# Patient Record
Sex: MALE | Race: WHITE | NOT HISPANIC OR LATINO | URBAN - METROPOLITAN AREA
[De-identification: names, ages, dates, MRNs, and addresses within clinical notes are randomized per-mention and may not be internally consistent; named-entity substitution may affect disease eponyms.]

---

## 2017-05-03 PROBLEM — 40739000 DYSPHAGIA: Status: ACTIVE | Noted: 2017-05-03

## 2017-05-03 PROBLEM — 428283002 HISTORY OF POLYP OF COLON (SITUATION): Status: ACTIVE | Noted: 2017-05-03

## 2019-04-25 PROBLEM — 414916001 OBESITY: Status: ACTIVE | Noted: 2019-04-25

## 2022-02-17 ENCOUNTER — OFFICE VISIT (OUTPATIENT)
Dept: URBAN - METROPOLITAN AREA CLINIC 27 | Facility: CLINIC | Age: 62
End: 2022-02-17

## 2022-04-04 ENCOUNTER — OFFICE VISIT (OUTPATIENT)
Dept: URBAN - METROPOLITAN AREA SURGERY CENTER 7 | Facility: SURGERY CENTER | Age: 62
End: 2022-04-04

## 2022-04-30 ENCOUNTER — TELEPHONE ENCOUNTER (OUTPATIENT)
Dept: URBAN - METROPOLITAN AREA CLINIC 121 | Facility: CLINIC | Age: 62
End: 2022-04-30

## 2022-04-30 RX ORDER — LISINOPRIL AND HYDROCHLOROTHIAZIDE TABLETS 20; 25 MG/1; MG/1
TABLET ORAL
OUTPATIENT
Start: 2010-06-01

## 2022-04-30 RX ORDER — ASPIRIN 81 MG/1
TABLET, COATED ORAL
OUTPATIENT
Start: 2010-06-01

## 2022-04-30 RX ORDER — ATENOLOL 50 MG/1
TABLET ORAL
OUTPATIENT
Start: 2010-06-01

## 2022-04-30 RX ORDER — SERTRALINE 50 MG/1
TABLET, FILM COATED ORAL
OUTPATIENT
Start: 2010-06-01 | End: 2011-06-08

## 2022-04-30 RX ORDER — ALPRAZOLAM 0.5 MG
TABLET ORAL
OUTPATIENT
Start: 2017-05-03

## 2022-04-30 RX ORDER — SERTRALINE 50 MG/1
TABLET, FILM COATED ORAL
OUTPATIENT
Start: 2010-06-01

## 2022-04-30 RX ORDER — ALPRAZOLAM 0.5 MG
TABLET ORAL
OUTPATIENT
Start: 2017-05-03 | End: 2019-04-25

## 2022-04-30 RX ORDER — SIMVASTATIN 40 MG/1
TABLET, FILM COATED ORAL
OUTPATIENT
Start: 2010-06-01

## 2022-05-01 ENCOUNTER — TELEPHONE ENCOUNTER (OUTPATIENT)
Dept: URBAN - METROPOLITAN AREA CLINIC 121 | Facility: CLINIC | Age: 62
End: 2022-05-01

## 2022-05-01 RX ORDER — PHENAZOPYRIDINE HYDROCHLORIDE 100 MG/1
QD TABLET, COATED ORAL
Status: ACTIVE | COMMUNITY
Start: 2014-08-12

## 2022-05-01 RX ORDER — SITAGLIPTIN AND METFORMIN HYDROCHLORIDE 1000; 50 MG/1; MG/1
QD TABLET, FILM COATED, EXTENDED RELEASE ORAL
Status: ACTIVE | COMMUNITY
Start: 2014-08-12

## 2022-05-01 RX ORDER — ASPIRIN 81 MG/1
QD TABLET, COATED ORAL
Status: ACTIVE | COMMUNITY
Start: 2014-08-12

## 2022-05-01 RX ORDER — ACETAMINOPHEN 500 MG
TABLET ORAL
Status: ACTIVE | COMMUNITY
Start: 2017-05-03

## 2022-05-01 RX ORDER — ATENOLOL 50 MG/1
QD TABLET ORAL
Status: ACTIVE | COMMUNITY
Start: 2014-08-12

## 2022-05-01 RX ORDER — SIMVASTATIN 40 MG/1
QD TABLET, FILM COATED ORAL
Status: ACTIVE | COMMUNITY
Start: 2014-08-12

## 2022-05-01 RX ORDER — GLUCOSAMINE/MSM/CHONDROIT SULF 500-166.6
TABLET ORAL
Status: ACTIVE | COMMUNITY
Start: 2010-06-01

## 2022-05-01 RX ORDER — LISINOPRIL AND HYDROCHLOROTHIAZIDE TABLETS 20; 25 MG/1; MG/1
QD TABLET ORAL
Status: ACTIVE | COMMUNITY
Start: 2014-08-12

## 2022-05-01 RX ORDER — DULOXETINE HCL 30 MG
CAPSULE,DELAYED RELEASE (ENTERIC COATED) ORAL
Status: ACTIVE | COMMUNITY

## 2024-06-19 ENCOUNTER — OFFICE VISIT (OUTPATIENT)
Dept: URBAN - METROPOLITAN AREA CLINIC 27 | Facility: CLINIC | Age: 64
End: 2024-06-19
Payer: COMMERCIAL

## 2024-06-19 ENCOUNTER — DASHBOARD ENCOUNTERS (OUTPATIENT)
Age: 64
End: 2024-06-19

## 2024-06-19 VITALS
HEART RATE: 59 BPM | SYSTOLIC BLOOD PRESSURE: 130 MMHG | DIASTOLIC BLOOD PRESSURE: 73 MMHG | BODY MASS INDEX: 35.36 KG/M2 | WEIGHT: 247 LBS | HEIGHT: 70 IN

## 2024-06-19 DIAGNOSIS — Z86.010 PERSONAL HISTORY OF COLONIC POLYPS: ICD-10-CM

## 2024-06-19 DIAGNOSIS — I10 PRIMARY HYPERTENSION: ICD-10-CM

## 2024-06-19 DIAGNOSIS — E11.9 TYPE 2 DIABETES MELLITUS WITHOUT COMPLICATION, UNSPECIFIED WHETHER LONG TERM INSULIN USE: ICD-10-CM

## 2024-06-19 DIAGNOSIS — K57.50 DIVERTICUL DISEASE SMALL AND LARGE INTESTINE, NO PERFORATI OR ABSCESS: ICD-10-CM

## 2024-06-19 PROBLEM — 397881000: Status: ACTIVE | Noted: 2024-06-19

## 2024-06-19 PROCEDURE — 99203 OFFICE O/P NEW LOW 30 MIN: CPT | Performed by: INTERNAL MEDICINE

## 2024-06-19 PROCEDURE — 99243 OFF/OP CNSLTJ NEW/EST LOW 30: CPT | Performed by: INTERNAL MEDICINE

## 2024-06-19 RX ORDER — LISINOPRIL AND HYDROCHLOROTHIAZIDE TABLETS 20; 25 MG/1; MG/1
QD TABLET ORAL
Status: ACTIVE | COMMUNITY
Start: 2014-08-12

## 2024-06-19 RX ORDER — ACETAMINOPHEN 500 MG
TABLET ORAL
Status: ACTIVE | COMMUNITY
Start: 2017-05-03

## 2024-06-19 RX ORDER — ATENOLOL 50 MG/1
QD TABLET ORAL
Status: ACTIVE | COMMUNITY
Start: 2014-08-12

## 2024-06-19 RX ORDER — DULOXETINE HCL 30 MG
CAPSULE,DELAYED RELEASE (ENTERIC COATED) ORAL
Status: DISCONTINUED | COMMUNITY

## 2024-06-19 RX ORDER — SIMVASTATIN 40 MG/1
QD TABLET, FILM COATED ORAL
Status: ACTIVE | COMMUNITY
Start: 2014-08-12

## 2024-06-19 RX ORDER — UBIDECARENONE 100 MG
CAPSULE ORAL
Status: ACTIVE | COMMUNITY
Start: 2010-06-01

## 2024-06-19 RX ORDER — PHENAZOPYRIDINE HYDROCHLORIDE 100 MG/1
QD TABLET, COATED ORAL
Status: DISCONTINUED | COMMUNITY
Start: 2014-08-12

## 2024-06-19 RX ORDER — ASPIRIN 81 MG/1
QD TABLET, COATED ORAL
Status: ACTIVE | COMMUNITY
Start: 2014-08-12

## 2024-06-19 RX ORDER — SITAGLIPTIN AND METFORMIN HYDROCHLORIDE 1000; 50 MG/1; MG/1
QD TABLET, FILM COATED, EXTENDED RELEASE ORAL
Status: ACTIVE | COMMUNITY
Start: 2014-08-12

## 2024-06-19 NOTE — HPI-TODAY'S VISIT:
Mr. Garcia is a 64-year-old established patient who presents to schedule his surveillance colonoscopy. His last colonoscopy 2 years ago was significant for diverticulosis, hemorrhoids and a 8mm flat polyp. However, the quality of his prep was suboptimal. He denies nausea/vomiting, dysphagia, abdominal pain, melena, rectal bleeding, weight loss, fever. No FMHX of CRC or colon polyps.

## 2024-08-05 ENCOUNTER — CLAIMS CREATED FROM THE CLAIM WINDOW (OUTPATIENT)
Dept: URBAN - METROPOLITAN AREA CLINIC 4 | Facility: CLINIC | Age: 64
End: 2024-08-05
Payer: COMMERCIAL

## 2024-08-05 ENCOUNTER — CLAIMS CREATED FROM THE CLAIM WINDOW (OUTPATIENT)
Dept: URBAN - METROPOLITAN AREA SURGERY CENTER 7 | Facility: SURGERY CENTER | Age: 64
End: 2024-08-05
Payer: COMMERCIAL

## 2024-08-05 DIAGNOSIS — K57.30 DIVERTICULA OF COLON: ICD-10-CM

## 2024-08-05 DIAGNOSIS — Z12.11 COLON CANCER SCREENING (HIGH RISK): ICD-10-CM

## 2024-08-05 DIAGNOSIS — K63.89 OTHER SPECIFIED DISEASES OF INTESTINE: ICD-10-CM

## 2024-08-05 DIAGNOSIS — Z86.010 ADENOMAS PERSONAL HISTORY OF COLONIC POLYPS: ICD-10-CM

## 2024-08-05 DIAGNOSIS — K64.8 INTERNAL HEMORRHOIDS: ICD-10-CM

## 2024-08-05 DIAGNOSIS — D12.3 ADENOMATOUS POLYP OF TRANSVERSE COLON: ICD-10-CM

## 2024-08-05 DIAGNOSIS — Z86.010 PERSONAL HISTORY OF COLON POLYPS: ICD-10-CM

## 2024-08-05 PROCEDURE — 45380 COLONOSCOPY AND BIOPSY: CPT | Performed by: INTERNAL MEDICINE

## 2024-08-05 PROCEDURE — G9998 DOC MED RSN <3 COLON: HCPCS | Performed by: INTERNAL MEDICINE

## 2024-08-05 PROCEDURE — 88305 TISSUE EXAM BY PATHOLOGIST: CPT | Performed by: PATHOLOGY

## 2024-08-05 PROCEDURE — 00812 ANES LWR INTST SCR COLSC: CPT | Performed by: NURSE ANESTHETIST, CERTIFIED REGISTERED

## 2024-08-05 PROCEDURE — 45385 COLONOSCOPY W/LESION REMOVAL: CPT | Performed by: INTERNAL MEDICINE

## 2024-08-05 RX ORDER — ACETAMINOPHEN 500 MG
TABLET ORAL
Status: ACTIVE | COMMUNITY
Start: 2017-05-03

## 2024-08-05 RX ORDER — SITAGLIPTIN AND METFORMIN HYDROCHLORIDE 1000; 50 MG/1; MG/1
QD TABLET, FILM COATED, EXTENDED RELEASE ORAL
Status: ACTIVE | COMMUNITY
Start: 2014-08-12

## 2024-08-05 RX ORDER — UBIDECARENONE 100 MG
CAPSULE ORAL
Status: ACTIVE | COMMUNITY
Start: 2010-06-01

## 2024-08-05 RX ORDER — ASPIRIN 81 MG/1
QD TABLET, COATED ORAL
Status: ACTIVE | COMMUNITY
Start: 2014-08-12

## 2024-08-05 RX ORDER — SIMVASTATIN 40 MG/1
QD TABLET, FILM COATED ORAL
Status: ACTIVE | COMMUNITY
Start: 2014-08-12

## 2024-08-05 RX ORDER — LISINOPRIL AND HYDROCHLOROTHIAZIDE TABLETS 20; 25 MG/1; MG/1
QD TABLET ORAL
Status: ACTIVE | COMMUNITY
Start: 2014-08-12

## 2024-08-05 RX ORDER — ATENOLOL 50 MG/1
QD TABLET ORAL
Status: ACTIVE | COMMUNITY
Start: 2014-08-12